# Patient Record
Sex: FEMALE | Race: WHITE
[De-identification: names, ages, dates, MRNs, and addresses within clinical notes are randomized per-mention and may not be internally consistent; named-entity substitution may affect disease eponyms.]

---

## 2019-01-14 ENCOUNTER — HOSPITAL ENCOUNTER (EMERGENCY)
Dept: HOSPITAL 10 - E/R | Age: 39
Discharge: HOME | End: 2019-01-14
Payer: MEDICAID

## 2019-01-14 ENCOUNTER — HOSPITAL ENCOUNTER (EMERGENCY)
Dept: HOSPITAL 91 - E/R | Age: 39
Discharge: HOME | End: 2019-01-14
Payer: MEDICAID

## 2019-01-14 VITALS
HEIGHT: 63 IN | BODY MASS INDEX: 29.26 KG/M2 | HEIGHT: 63 IN | WEIGHT: 165.13 LBS | WEIGHT: 165.13 LBS | BODY MASS INDEX: 29.26 KG/M2

## 2019-01-14 VITALS — HEART RATE: 95 BPM | DIASTOLIC BLOOD PRESSURE: 71 MMHG | SYSTOLIC BLOOD PRESSURE: 103 MMHG | RESPIRATION RATE: 20 BRPM

## 2019-01-14 DIAGNOSIS — N30.90: Primary | ICD-10-CM

## 2019-01-14 DIAGNOSIS — R40.2362: ICD-10-CM

## 2019-01-14 DIAGNOSIS — R40.2142: ICD-10-CM

## 2019-01-14 DIAGNOSIS — R73.9: ICD-10-CM

## 2019-01-14 DIAGNOSIS — R40.2252: ICD-10-CM

## 2019-01-14 LAB
ABNORMAL IP MESSAGE: 1
ADD MAN DIFF?: YES
ADD UMIC: YES
ALANINE AMINOTRANSFERASE: 35 IU/L (ref 13–69)
ALBUMIN/GLOBULIN RATIO: 1.02
ALBUMIN: 3.6 G/DL (ref 3.3–4.9)
ALKALINE PHOSPHATASE: 171 IU/L (ref 42–121)
ANION GAP: 17 (ref 5–13)
ASPARTATE AMINO TRANSFERASE: 65 IU/L (ref 15–46)
BAND NEUTROPHILS #M: 4.1 10^3/UL (ref 0–0.6)
BAND NEUTROPHILS % (M): 26 % (ref 0–4)
BILIRUBIN,DIRECT: 0 MG/DL (ref 0–0.2)
BILIRUBIN,TOTAL: 0.1 MG/DL (ref 0.2–1.3)
BLOOD UREA NITROGEN: 37 MG/DL (ref 7–20)
CALCIUM: 9 MG/DL (ref 8.4–10.2)
CARBON DIOXIDE: 21 MMOL/L (ref 21–31)
CHLORIDE: 90 MMOL/L (ref 97–110)
CREATININE: 0.81 MG/DL (ref 0.44–1)
GIANT THROMBO% (M): 1 % (ref 0–0)
GLOBULIN: 3.5 G/DL (ref 1.3–3.2)
GLUCOSE: 534 MG/DL (ref 70–220)
HEMATOCRIT: 38.6 % (ref 37–47)
HEMOGLOBIN: 13.8 G/DL (ref 12–16)
IMMATURE GRANS #M: 0.28 10^3/UL (ref 0–0.03)
IMMATURE GRANS % (M): 1.8 % (ref 0–0.43)
LIPASE: < 10 U/L (ref 23–300)
MEAN CORPUSCULAR HEMOGLOBIN: 31.2 PG (ref 29–33)
MEAN CORPUSCULAR HGB CONC: 35.8 G/DL (ref 32–37)
MEAN CORPUSCULAR VOLUME: 87.3 FL (ref 82–101)
MEAN PLATELET VOLUME: 12.4 FL (ref 7.4–10.4)
MONOCYTE #M: 1.1 10^3/UL (ref 0.3–0.9)
MONOCYTES % (M): 7 % (ref 0–11)
NUCLEATED RED BLOOD CELLS%: 0 /100WBC (ref 0–0)
PLATELET COUNT: 95 10^3/UL (ref 140–415)
PLATELET ESTIMATE: (no result)
POLYCHROMASIA: (no result) (ref 0–0)
POSITIVE DIFF: (no result)
POTASSIUM: 3.7 MMOL/L (ref 3.5–5.1)
REACTIVE LYMPHOCYTES #M: 0.1 10^3/UL (ref 0–0)
REACTIVE LYMPHOCYTES% (M): 1 % (ref 0–0)
RED BLOOD COUNT: 4.42 10^6/UL (ref 4.2–5.4)
RED CELL DISTRIBUTION WIDTH: 10.9 % (ref 11.5–14.5)
SEG NEUT #M: 11.1 10^3/UL (ref 1.6–7.5)
SEGMENTED NEUTROPHILS (M) %: 66 % (ref 39–77)
SODIUM: 128 MMOL/L (ref 135–144)
TOTAL PROTEIN: 7.1 G/DL (ref 6.1–8.1)
UR ASCORBIC ACID: NEGATIVE MG/DL
UR BACTERIA: (no result) /HPF
UR BILIRUBIN (DIP): NEGATIVE MG/DL
UR BLOOD (DIP): (no result) MG/DL
UR BUDDING YEAST: (no result) /HPF
UR CLARITY: (no result)
UR COLOR: YELLOW
UR GLUCOSE (DIP): (no result) MG/DL
UR KETONES (DIP): (no result) MG/DL
UR LEUKOCYTE ESTERASE (DIP): (no result) LEU/UL
UR MUCUS: (no result) /HPF
UR NITRITE (DIP): NEGATIVE MG/DL
UR PH (DIP): 5 (ref 5–9)
UR RBC: 7 /HPF (ref 0–5)
UR SPECIFIC GRAVITY (DIP): 1.01 (ref 1–1.03)
UR SQUAMOUS EPITHELIAL CELL: (no result) /HPF
UR TOTAL PROTEIN (DIP): (no result) MG/DL
UR UROBILINOGEN (DIP): NEGATIVE MG/DL
UR WBC: 83 /HPF (ref 0–5)
WHITE BLOOD COUNT: 15.8 10^3/UL (ref 4.8–10.8)

## 2019-01-14 PROCEDURE — 81025 URINE PREGNANCY TEST: CPT

## 2019-01-14 PROCEDURE — 80053 COMPREHEN METABOLIC PANEL: CPT

## 2019-01-14 PROCEDURE — 96372 THER/PROPH/DIAG INJ SC/IM: CPT

## 2019-01-14 PROCEDURE — 85025 COMPLETE CBC W/AUTO DIFF WBC: CPT

## 2019-01-14 PROCEDURE — 74176 CT ABD & PELVIS W/O CONTRAST: CPT

## 2019-01-14 PROCEDURE — 96375 TX/PRO/DX INJ NEW DRUG ADDON: CPT

## 2019-01-14 PROCEDURE — 36415 COLL VENOUS BLD VENIPUNCTURE: CPT

## 2019-01-14 PROCEDURE — 83690 ASSAY OF LIPASE: CPT

## 2019-01-14 PROCEDURE — 99285 EMERGENCY DEPT VISIT HI MDM: CPT

## 2019-01-14 PROCEDURE — 82962 GLUCOSE BLOOD TEST: CPT

## 2019-01-14 PROCEDURE — 81001 URINALYSIS AUTO W/SCOPE: CPT

## 2019-01-14 PROCEDURE — 96374 THER/PROPH/DIAG INJ IV PUSH: CPT

## 2019-01-14 RX ADMIN — INSULIN LISPRO 1 UNIT: 100 INJECTION, SOLUTION INTRAVENOUS; SUBCUTANEOUS at 08:17

## 2019-01-14 RX ADMIN — NITROFURANTOIN MONOHYDRATE/MACROCRYSTALLINE 1 MG: 25; 75 CAPSULE ORAL at 08:15

## 2019-01-14 RX ADMIN — INSULIN LISPRO 1 UNIT: 100 INJECTION, SOLUTION INTRAVENOUS; SUBCUTANEOUS at 09:47

## 2019-01-14 RX ADMIN — KETOROLAC TROMETHAMINE 1 MG: 30 INJECTION, SOLUTION INTRAMUSCULAR at 07:15

## 2019-01-14 RX ADMIN — THIAMINE HYDROCHLORIDE 1 MLS/HR: 100 INJECTION, SOLUTION INTRAMUSCULAR; INTRAVENOUS at 07:17

## 2019-01-14 RX ADMIN — THIAMINE HYDROCHLORIDE 1 MLS/HR: 100 INJECTION, SOLUTION INTRAMUSCULAR; INTRAVENOUS at 08:44

## 2019-01-14 RX ADMIN — ONDANSETRON HYDROCHLORIDE 1 MG: 2 INJECTION, SOLUTION INTRAMUSCULAR; INTRAVENOUS at 07:15

## 2019-01-14 NOTE — ERD
ER Documentation


Chief Complaint


Chief Complaint





ABD PAIN X5 DAYS, NAUSEA, VOMITING, NO FEVER





HPI


Patient is a 38-year-old female with diabetes who presents with abdominal pain. 


The patient has bilateral lower abdominal pain for the past 5 days.  The 


symptoms have been constant.  The patient has nausea and vomiting.  The patient 


tried ibuprofen for pain.  The patient has no fevers.  There are no urinary 


symptoms.  The patient does not currently have a primary doctor.  Upon review of


old medical records the patient has multiple visits for various complaints.





ROS


All systems reviewed and are negative except as per history of present illness.





Medications


Home Meds


Active Scripts


Nitrofurantoin Monohyd Macrocr* (Macrobid*) 100 Mg Capsr, 100 MG PO BID for 7 


Days, CAP


   Prov:MANUELITO DAVIS MD         1/14/19


Ondansetron (Ondansetron Odt) 4 Mg Tab.rapdis, 4 MG PO Q6H PRN for NAUSEA AND/OR


VOMITING, #30 TAB


   Prov:MANUELITO DAVIS MD         1/14/19


Discontinued Reported Medications


Prenatal Vits W-Ca,Fe,Fa(<1MG) (Prenatal) 1 Tab Tablet, 1 TAB PO


   3/24/13


Glyburide* (Glyburide*) 5 Mg Tablet, 5 MG PO BID


   4/1/12





Allergies


Allergies:  


Coded Allergies:  


     No Known Allergy (Verified , 1/14/19)





PMhx/Soc


History of Surgery:  No


Anesthesia Reaction:  No


Hx Neurological Disorder:  No


Hx Respiratory Disorders:  No


Hx Cardiac Disorders:  No


Hx Psychiatric Problems:  No


Hx Miscellaneous Medical Probl:  Yes


Hx Alcohol Use:  No


Hx Substance Use:  No


Hx Tobacco Use:  No


Smoking Status:  Never smoker





FmHx


Family History:  diabetes





Physical Exam


Vitals





Vital Signs


  Date      Temp  Pulse  Resp  B/P (MAP)   Pulse Ox  O2          O2 Flow    FiO2


Time                                                 Delivery    Rate


   1/14/19  97.1    106    17  94/61 (72)       100


     06:37





Physical Exam


Const:   No acute distress


Head:   Atraumatic 


Eyes:    Normal Conjunctiva


ENT:    Normal External Ears, Nose and Mouth.


Neck:               Full range of motion. No meningismus.


Resp:   Clear to auscultation bilaterally


Cardio:   Regular rate and rhythm, no murmurs


Abd:    Soft, lower abdominal tenderness to palpation without rebound or 


guarding


Skin:   No petechiae or rashes


Back:   No midline or flank tenderness


Ext:    No cyanosis, or edema


Neur:   Awake and alert


Psych:    Normal Mood and Affect


Result Diagram:  


1/14/19 0700                                                                    


           1/14/19 0700





Results 24 hrs





Laboratory Tests


Test
                                1/14/19
07:00  1/14/19
07:11  1/14/19
09:22


White Blood Count                    15.8 10^3/ul


Red Blood Count                      4.42 10^6/ul


Hemoglobin                              13.8 g/dl


Hematocrit                                 38.6 %


Mean Corpuscular Volume                   87.3 fl


Mean Corpuscular Hemoglobin               31.2 pg


Mean Corpuscular                       35.8 g/dl 
  
              



Hemoglobin
Concent


Red Cell Distribution Width                10.9 %


Platelet Count                         95 10^3/UL


Mean Platelet Volume                      12.4 fl


Immature Granulocytes %                   1.800 %


Neutrophils %                       %


Segmented Neutrophils %
(Manual)            66 % 
  
              



Band Neutrophils % (Manual)                  26 %


Lymphocytes %                       %


Reactive Lymphocytes %
(Manual)              1 % 
  
              



Monocytes %                         %


Monocytes % (Manual)                          7 %


Eosinophils %                       %


Basophils %                         %


Nucleated Red Blood Cells %           0.0 /100WBC


Immature Granulocytes #             0.280 10^3/ul


Neutrophils #                       10^3/ul


Neutrophils # (Manual)               11.1 10^3/ul


Band Neutrophils #                    4.1 10^3/ul


Lymphocytes #                       10^3/ul


Reactive Lymphocytes #                0.1 10^3/ul


Monocytes #                         10^3/ul


Monocytes # (Manual)                  1.1 10^3/ul


Eosinophils #                       10^3/ul


Basophils #                         10^3/ul


Nucleated Red Blood Cells #         10^3/ul


Platelet Estimate                  DECREASED


Giant Platelets                               1 %


Polychromasia                                  3+


Urine Color                        YELLOW


Urine Clarity                      CLOUDY


Urine pH                                      5.0


Urine Specific Gravity                      1.015


Urine Ketones                            1+ mg/dL


Urine Nitrite                      NEGATIVE mg/dL


Urine Bilirubin                    NEGATIVE mg/dL


Urine Urobilinogen                 NEGATIVE mg/dL


Urine Leukocyte Esterase                1+ Miguel/ul


Urine Microscopic RBC                      7 /HPF


Urine Microscopic WBC                     83 /HPF


Urine Squamous Epithelial
Cells    FEW /HPF 
       
              



Urine Bacteria                     MANY /HPF


Urine Mucus                        FEW /HPF


Urine Yeast (Budding)              MANY /HPF


Urine Hemoglobin                         2+ mg/dL


Urine Glucose                            3+ mg/dL


Urine Total Protein                      2+ mg/dl


Sodium Level                           128 mmol/L


Potassium Level                        3.7 mmol/L


Chloride Level                          90 mmol/L


Carbon Dioxide Level                    21 mmol/L


Anion Gap                                      17


Blood Urea Nitrogen                      37 mg/dl


Creatinine                             0.81 mg/dl


Est Glomerular Filtrat             > 60 mL/min 
    
              



Rate
mL/min


Glucose Level                           534 mg/dl


Calcium Level                           9.0 mg/dl


Total Bilirubin                         0.1 mg/dl


Direct Bilirubin                       0.00 mg/dl


Indirect Bilirubin                      0.1 mg/dl


Aspartate Amino Transf
(AST/SGOT)        65 IU/L 
  
              



Alanine                                  35 IU/L 
  
              



Aminotransferase
(ALT/SGPT)


Alkaline Phosphatase                     171 IU/L


Total Protein                            7.1 g/dl


Albumin                                  3.6 g/dl


Globulin                                3.50 g/dl


Albumin/Globulin Ratio                       1.02


Lipase                             < 10 U/L


POC Beta HCG, Qualitative                           NEGATIVE


Bedside Glucose                                                       427 mg/dL





Current Medications


 Medications
   Dose
          Sig/Stoney
       Start Time
   Status  Last


 (Trade)       Ordered        Route
 PRN     Stop Time              Admin
Dose


                              Reason                                Admin


 Sodium         1,000 ml @ 
   Q1H STAT
      1/14/19       DC           1/14/19


Chloride       1,000 mls/hr   IV
            06:49
                       07:17



                                             1/14/19 07:48


 Ondansetron    4 mg           ONCE  STAT
    1/14/19       DC           1/14/19


HCl
  (Zofran                 IV
            06:49
                       07:15



Inj)                                         1/14/19 06:50


 Ketorolac
     30 mg          ONCE  STAT
    1/14/19       DC           1/14/19


Tromethamine
                 IV
            06:49
                       07:15



 (Toradol)                                   1/14/19 06:50


 Insulin        10 unit        ONCE  ONCE
    1/14/19       DC           1/14/19


Human
                        SC
            08:00
                       08:17



Lispro
                                      1/14/19 08:01


(Humalog)


                100 mg         ONCE  ONCE
    1/14/19       DC           1/14/19


Nitrofurantoi                 PO
            08:00
                       08:15



n
                                           1/14/19 08:01


Macrocrystals



  (Macrobid)


 Sodium         1,000 ml @ 
   Q1H STAT
      1/14/19       DC           1/14/19


Chloride       1,000 mls/hr   IV
            08:29
                       08:44



                                             1/14/19 09:28


 Insulin        10 unit        ONCE  ONCE
    1/14/19                



Human
                        SC
            10:00



Lispro
                                      1/14/19 10:01


(Humalog)








Procedures/MDM


CT abdomen pelvis read by radiology shows no surgical process.





Patient is a 38-year-old female who presents with abdominal pain.  She was found


to have acute cystitis.  She has hyperglycemia without diabetic ketoacidosis.  


The patient was given Humalog and fluids.  She will be given Macrobid as well.  


I doubt appendicitis, cholecystitis, pancreatitis, or bowel obstruction.  I 


believe outpatient management is appropriate but the patient needs close follow-


up with a primary doctor within 24 hours for reevaluation.  I will give him info


rmation for the local clinics.  She was provided with copies of laboratory 


studies and CT scan report prior to discharge.





Departure


Diagnosis:  


   Primary Impression:  


   Cystitis


   Additional Impressions:  


   Hyperglycemia


   Abdominal pain


   Abdominal location:  lower abdomen, unspecified  Qualified Codes:  R10.30 - 


   Lower abdominal pain, unspecified


   Vomiting


   Vomiting type:  unspecified  Vomiting Intractability:  non-intractable  


   Nausea presence:  with nausea  Qualified Codes:  R11.2 - Nausea with 


   vomiting, unspecified


Condition:  Fair


Patient Instructions:  Abdominal Pain, Cystitis


Referrals:  


[facility]


Usted se ha hecho un examen mdico de control que le indica que no est en tri 


condicin que requiera tratamiento urgente en el Departamento de Emergencia. Un 


estudio ms profundo y el tratamiento de cook condicin pueden esperar sin ningn 


riesgo hasta que usted sea atendida/o en el consultorio de cook mdico o tri 


clnica. Es responsabilidad suya arreglar tri alex para el seguimiento del yakov.








MANEJO DE CONDICIONES NO URGENTES EN EL FUTURO


1) Si usted tiene un mdico de atencin primaria:





Usted debera llamar a cook mdico de atencin primaria antes de venir al 


departamento de emergencia. Despus de las horas de consultorio, cook doctor o cook 


asociado/a est disponible por telfono. El mdico o enfermero de osiris en el 


servicio telefnico puede asesorarle por ester medio para atender el problema, o 


yakov contrario se puede programar tri alex.





2) Si usted no tiene un mdico de atencin primaria:


Llame al mdico o clnica de referencia que aparece abajo jocelyn las horas de 


consultorio para hacer tri alex para que le vean.





CLINICAS:


[facility]  [phone] [address] [facility]  [phone]


[address] [facility]. [facility] [phone]


[address] [place] [id number] [phone]


[address] [place] [id number] [phone] [facility] [phone] 


[address]





Additional Instructions:  


Llame al doctor MAANA y carlos tri ALEX PARA DENTRO DE 1-2 BACH.Dgale a la 


secretaria que nosotros le instruimos hacer esta alex.Avise o llame si cook con


dicin se empeora antes de la alex. Regresa aqui si peor o no mejor.











MANUELITO DAVIS MD                Jan 14, 2019 09:44